# Patient Record
Sex: MALE | Race: WHITE | Employment: FULL TIME | URBAN - METROPOLITAN AREA
[De-identification: names, ages, dates, MRNs, and addresses within clinical notes are randomized per-mention and may not be internally consistent; named-entity substitution may affect disease eponyms.]

---

## 2019-08-16 ENCOUNTER — APPOINTMENT (OUTPATIENT)
Dept: GENERAL RADIOLOGY | Facility: CLINIC | Age: 34
End: 2019-08-16
Attending: EMERGENCY MEDICINE
Payer: COMMERCIAL

## 2019-08-16 ENCOUNTER — HOSPITAL ENCOUNTER (EMERGENCY)
Facility: CLINIC | Age: 34
Discharge: HOME | End: 2019-08-16
Attending: EMERGENCY MEDICINE | Admitting: EMERGENCY MEDICINE
Payer: COMMERCIAL

## 2019-08-16 VITALS
OXYGEN SATURATION: 98 % | TEMPERATURE: 97.7 F | DIASTOLIC BLOOD PRESSURE: 83 MMHG | SYSTOLIC BLOOD PRESSURE: 161 MMHG | RESPIRATION RATE: 18 BRPM | HEART RATE: 65 BPM

## 2019-08-16 DIAGNOSIS — R00.2 PALPITATIONS: ICD-10-CM

## 2019-08-16 DIAGNOSIS — E87.6 HYPOKALEMIA: ICD-10-CM

## 2019-08-16 LAB
ANION GAP SERPL CALCULATED.3IONS-SCNC: 8 MMOL/L (ref 3–14)
BASOPHILS # BLD AUTO: 0.1 10E9/L (ref 0–0.2)
BASOPHILS NFR BLD AUTO: 0.7 %
BUN SERPL-MCNC: 18 MG/DL (ref 7–30)
CALCIUM SERPL-MCNC: 9.1 MG/DL (ref 8.5–10.1)
CHLORIDE SERPL-SCNC: 107 MMOL/L (ref 94–109)
CO2 SERPL-SCNC: 26 MMOL/L (ref 20–32)
CREAT SERPL-MCNC: 1.13 MG/DL (ref 0.66–1.25)
DIFFERENTIAL METHOD BLD: NORMAL
EOSINOPHIL # BLD AUTO: 0.2 10E9/L (ref 0–0.7)
EOSINOPHIL NFR BLD AUTO: 2 %
ERYTHROCYTE [DISTWIDTH] IN BLOOD BY AUTOMATED COUNT: 12.5 % (ref 10–15)
GFR SERPL CREATININE-BSD FRML MDRD: 85 ML/MIN/{1.73_M2}
GLUCOSE SERPL-MCNC: 104 MG/DL (ref 70–99)
HCT VFR BLD AUTO: 43.6 % (ref 40–53)
HGB BLD-MCNC: 15.5 G/DL (ref 13.3–17.7)
IMM GRANULOCYTES # BLD: 0 10E9/L (ref 0–0.4)
IMM GRANULOCYTES NFR BLD: 0.3 %
LYMPHOCYTES # BLD AUTO: 1.6 10E9/L (ref 0.8–5.3)
LYMPHOCYTES NFR BLD AUTO: 20.6 %
MCH RBC QN AUTO: 31.1 PG (ref 26.5–33)
MCHC RBC AUTO-ENTMCNC: 35.6 G/DL (ref 31.5–36.5)
MCV RBC AUTO: 88 FL (ref 78–100)
MONOCYTES # BLD AUTO: 0.6 10E9/L (ref 0–1.3)
MONOCYTES NFR BLD AUTO: 8.1 %
NEUTROPHILS # BLD AUTO: 5.3 10E9/L (ref 1.6–8.3)
NEUTROPHILS NFR BLD AUTO: 68.3 %
NRBC # BLD AUTO: 0 10*3/UL
NRBC BLD AUTO-RTO: 0 /100
PLATELET # BLD AUTO: 250 10E9/L (ref 150–450)
POTASSIUM SERPL-SCNC: 3.2 MMOL/L (ref 3.4–5.3)
RBC # BLD AUTO: 4.98 10E12/L (ref 4.4–5.9)
SODIUM SERPL-SCNC: 141 MMOL/L (ref 133–144)
TROPONIN I SERPL-MCNC: <0.015 UG/L (ref 0–0.04)
TSH SERPL DL<=0.005 MIU/L-ACNC: 1.54 MU/L (ref 0.4–4)
WBC # BLD AUTO: 7.7 10E9/L (ref 4–11)

## 2019-08-16 PROCEDURE — 84443 ASSAY THYROID STIM HORMONE: CPT | Performed by: EMERGENCY MEDICINE

## 2019-08-16 PROCEDURE — 99285 EMERGENCY DEPT VISIT HI MDM: CPT

## 2019-08-16 PROCEDURE — 25000132 ZZH RX MED GY IP 250 OP 250 PS 637: Performed by: EMERGENCY MEDICINE

## 2019-08-16 PROCEDURE — 71046 X-RAY EXAM CHEST 2 VIEWS: CPT

## 2019-08-16 PROCEDURE — 85025 COMPLETE CBC W/AUTO DIFF WBC: CPT | Performed by: EMERGENCY MEDICINE

## 2019-08-16 PROCEDURE — 80048 BASIC METABOLIC PNL TOTAL CA: CPT | Performed by: EMERGENCY MEDICINE

## 2019-08-16 PROCEDURE — 84484 ASSAY OF TROPONIN QUANT: CPT | Performed by: EMERGENCY MEDICINE

## 2019-08-16 RX ORDER — POTASSIUM CHLORIDE 1500 MG/1
20 TABLET, EXTENDED RELEASE ORAL 2 TIMES DAILY
Qty: 14 TABLET | Refills: 0 | Status: SHIPPED | OUTPATIENT
Start: 2019-08-16 | End: 2019-08-23

## 2019-08-16 RX ORDER — POTASSIUM CHLORIDE 1.5 G/1.58G
40 POWDER, FOR SOLUTION ORAL ONCE
Status: COMPLETED | OUTPATIENT
Start: 2019-08-16 | End: 2019-08-16

## 2019-08-16 RX ADMIN — POTASSIUM CHLORIDE 40 MEQ: 1.5 POWDER, FOR SOLUTION ORAL at 18:44

## 2019-08-16 ASSESSMENT — ENCOUNTER SYMPTOMS
BLOOD IN STOOL: 1
FEVER: 0
DYSURIA: 0
FREQUENCY: 1
SHORTNESS OF BREATH: 0
CHEST TIGHTNESS: 1
NUMBNESS: 1
ABDOMINAL PAIN: 0
VOMITING: 0
PALPITATIONS: 1
COUGH: 0
RHINORRHEA: 0
NAUSEA: 0
DIAPHORESIS: 0

## 2019-08-16 NOTE — ED PROVIDER NOTES
"  History     Chief Complaint:  Chest Pain      HPI   Cong Coronel is a 34 year old male with a history of obesity, hypertension and anxiety who presents to the emergency department for evaluation of chest pain. The patient reports that for the last few months he's had intermittent episodes of \"thumping\" chest pain. He has been seen at [facility] several times with negative workups, echo below, and was told it is likely attributed to anxiety attacks. However, last night while watching TV, in addition to his typical chest pains, he had new jaw numbness, teeth tingling, warmth under his eyes, palpitations, and tingling, numbness, and chills in his fingertips and upper and lower extremities. He states he then did breathing exercises that mildly improved his symptoms and he was able to go to bed. Then today, he called his PCP and reported these new symptoms who recommended he come to the ED. Here, he reports of mild chest tightness. He denies the chest pain worsens with exertion. Additionally, he states that his last few bowel movements have been harder than usual and very painful, and he noticed blood on the toilet paper when he wiped. He's also had an increase in urinary frequency.  Patient denies nausea, vomiting, shortness of breath, diaphoresis, fevers, cough, runny nose, dysuria, abdominal pain, or regular caffeine use.    ECHOCARDIOGRAM.  Date: 12/13/2018 Start: 08:01 AM [facility]: [facility]  Technically difficult examination. Poor acoustic windows due to body  habitus .  Left ventricular ejection fraction is visually estimated at 55%.  No significant valvular abnormalities were identified.    Cardiac Risk Factors   Sex: Male   Tobacco: Negative   Hypertension: Positive  Diabetes: Negative  Hyperlipidemia: Negative  Family History: Negative    PE/DVT Risk Factors   Personal History: Negative  Recent Travel: Negative   Recent Surgery/Hospitalization: Negative  Tobacco: Negative  Family " History: Positive  Hormone Use: Negative   Cancer: Negative  Trauma: Negative      Allergies:  No Known Drug Allergies     Medications:    Oretic  Inderal  Zantac  Hydrochlorothiazide  Ativan     Past Medical History:    Obesity   Anxiety  Hypertension     Past Surgical History:    History reviewed. No pertinent past surgical history.    Family History:    Hypertension  DVTS?    Social History:  Occupation: Analyst   Tobacco Use: Never  Alcohol Use: No  PCP: Kingsley Pina   Marital Status:  Single      Review of Systems   Constitutional: Negative for diaphoresis and fever.   HENT: Negative for rhinorrhea.    Respiratory: Positive for chest tightness. Negative for cough and shortness of breath.    Cardiovascular: Positive for palpitations.   Gastrointestinal: Positive for blood in stool. Negative for abdominal pain, nausea and vomiting.   Genitourinary: Positive for frequency. Negative for dysuria.   Neurological: Positive for numbness.   All other systems reviewed and are negative.    Physical Exam     Patient Vitals for the past 24 hrs:   BP Temp Temp src Pulse Heart Rate Resp SpO2   08/16/19 1706 (!) 161/83 97.7  F (36.5  C) Oral 65 97 18 98 %     Physical Exam  Constitutional: Well developed, nontox appearance  Head: Atraumatic.   Mouth/Throat: Oropharynx is clear and moist.   Neck:  no stridor  Eyes: no scleral icterus  Cardiovascular: RRR, 2+ bilat radial pulses  Pulmonary/Chest: nml resp effort, Clear BS bilat  Abdominal: ND, +BS, soft, NT, no rebound or guarding   Ext: Warm, well perfused, no edema  Neurological: A&O, symmetric facies, moves ext x4  Skin: Skin is warm and dry.   Psychiatric: Behavior is normal. Thought content normal.   Nursing note and vitals reviewed.    Emergency Department Course   ECG:  @ 1706  Indication: Chest pain  Vent. Rate 63 bpm. CT interval 154 ms. QRS duration 102 ms. QT/QTc 408/417 ms. P-R-T axis 56 50 50.   Normal sinus rhythm. Normal ECG.    Read @ 1708 by   Robinson.    Imaging:  Chest X-Ray. 2 Views:   IMPRESSION: No acute cardiopulmonary disease.  Reading per radiology.     Radiographic findings were communicated with the patient who voiced understanding of the findings.    Laboratory:  CBC: WBC: 7.7, HGB: 15.5, PLT: 250  BMP: Glucose 104 (H), Potassium: 3.2 (L), o/w WNL (Creatinine: 1.13)    TSH with free T4 reflex: 1.54    1736 Troponin I: <0.015    Interventions:  1844 Potassium chloride packet 40 mEq PO    Emergency Department Course:  1715 Nursing notes and vitals reviewed. I performed an exam of the patient as documented above.     EKG was done, interpretation as above.    Medicine administered as documented above. Blood drawn. This was sent to the lab for further testing, results above.    The patient was sent for a chest XR while in the emergency department, findings above.     185 I rechecked the patient and discussed the results of his workup thus far.     Findings and plan explained to the Patient. Patient discharged home with instructions regarding supportive care, medications, and reasons to return. The importance of close follow-up was reviewed. The patient was prescribed potassium chloride.     I personally reviewed the laboratory results with the Patient and answered all related questions prior to discharge.     Impression & Plan    Medical Decision Makin year old male presenting w/ palpitations, chest discomfort     DDx includes palpitations, anxiety, electrolyte abnormality, thyroid dysfunction chest pain NOS, chest wall pain, GERD, pneumothorax, pneumonia.  ACS, PE, dissection seem unlikely given patient's risk factors, description of symptoms.  EKG interpretation as noted above sig for no acute ischemic findings or evidence of dysrhythmia.  Labs and imaging ordered as noted above.  Labs significant for mild hypokalemia otherwise unremarkable.  Imaging sig for no acute cardiopulmonary disease.  Potassium repleted as noted above.  Given  unremarkable work-up, I think the patient is safe for discharge.  Discussed various outpatient options for further evaluation including stress test and the patient elected to follow-up with his primary care doctor in 3 days for reevaluation.  I think this is unlikely to be ACS particularly given the patient reports exercising somewhat vigorously and remaining asymptomatic.  Possible anxiety component to symptoms as well.  At this time I feel the patient is safe for discharge.  Recommendations given regarding follow up with PCP and return to the emergency department as needed for new or worsening symptoms.  Patient counseled on all results, diagnosis and disposition.  They are understanding and agreeable to plan. Patient discharged in stable condition.      Diagnosis:    ICD-10-CM    1. Palpitations R00.2    2. Hypokalemia E87.6        Disposition:  Discharged to home    Discharge Medications:  New Prescriptions    POTASSIUM CHLORIDE ER (K-TAB) 20 MEQ CR TABLET    Take 1 tablet (20 mEq) by mouth 2 times daily for 7 days     Scribe Disclosure:  I, Bebeto Alarcon, am serving as a scribe on 8/16/2019 at 5:15 PM to personally document services performed by Anuj Varner MD based on my observations and the provider's statements to me.     Bebeto Alarcon  8/16/2019    EMERGENCY DEPARTMENT       Anuj Bowers MD  08/17/19 [id number]

## 2019-08-16 NOTE — ED AVS SNAPSHOT
Emergency Department  [address] [place] [address]  Phone:  [phone]  Fax:  [phone]                                    Cong Coronel   MRN: [id number]    Department:   Emergency Department   Date of Visit:  8/16/2019           After Visit Summary Signature Page    I have received my discharge instructions, and my questions have been answered. I have discussed any challenges I see with this plan with the nurse or doctor.    ..........................................................................................................................................  Patient/Patient Representative Signature      ..........................................................................................................................................  Patient Representative Print Name and Relationship to Patient    ..................................................               ................................................  Date                                   Time    ..........................................................................................................................................  Reviewed by Signature/Title    ...................................................              ..............................................  Date                                               Time          22EPIC Rev 08/18

## 2019-08-17 NOTE — DISCHARGE INSTRUCTIONS
Drink plenty of fluids.    Please return to the emergency department as needed for new or worsening symptoms including new or worsening chest pain, fainting, vomiting and unable to keep anything down, significant shortness of breath, any other concerning symptoms.      Discharge Instructions  Palpitations    Palpitations are an unusual awareness of your heartbeat. People often describe this as the heart skipping, fluttering, racing, irregular, or pounding. At this time, your provider has found no signs that your palpitations are due to a serious or life-threatening condition. However, sometimes there is a serious problem that does not show up right away.    Palpitations can be caused by caffeine, cigarettes, diet pills, energy drinks or supplements, other stimulants, and medications and street drugs. They can also be caused by anxiety, hormone conditions such as high thyroid, and other medical conditions. Sometimes they are a sign of abnormal rhythm in the heart. At this time, your provider did not find any dangerous cause of your symptoms.    Generally, every Emergency Department visit should have a follow-up clinic visit with either a primary or a specialty clinic/provider. Please follow-up as instructed by your emergency provider today.    Return to the Emergency Department if:  You get chest pain or tightness.  You are short of breath.  You get very weak or tired.  You pass out or faint.  Your heart rate is over 120 beats per minute for more than 10 minutes while you are resting.   You have anything else that worries you.    What can I do to help myself?  Fill any prescriptions the provider gave you and take them right away.   Follow your provider s instructions about the prescription medicines you are on. Sometimes the provider may tell you to stop taking a medicine or change the dose.  If you smoke, this may be a good time to quit! The less you can smoke, the better.  Do not use energy drinks, diet pills, or  stimulants. Limit your use of caffeine.  If you were given a prescription for medicine here today, be sure to read all of the information (including the package insert) that comes with your prescription.  This will include important information about the medicine, its side effects, and any warnings that you need to know about.  The pharmacist who fills the prescription can provide more information and answer questions you may have about the medicine.  If you have questions or concerns that the pharmacist cannot address, please call or return to the Emergency Department.     Remember that you can always come back to the Emergency Department if you are not able to see your regular provider in the amount of time listed above, if you get any new symptoms, or if there is anything that worries you.

## 2019-09-25 ENCOUNTER — HOSPITAL ENCOUNTER (EMERGENCY)
Facility: CLINIC | Age: 34
Discharge: HOME | End: 2019-09-25
Attending: EMERGENCY MEDICINE | Admitting: EMERGENCY MEDICINE
Payer: COMMERCIAL

## 2019-09-25 VITALS
HEIGHT: 77 IN | SYSTOLIC BLOOD PRESSURE: 139 MMHG | DIASTOLIC BLOOD PRESSURE: 80 MMHG | OXYGEN SATURATION: 97 % | HEART RATE: 63 BPM | BODY MASS INDEX: 35.42 KG/M2 | TEMPERATURE: 98.4 F | RESPIRATION RATE: 18 BRPM | WEIGHT: 300 LBS

## 2019-09-25 DIAGNOSIS — R22.0 TONGUE SWELLING: ICD-10-CM

## 2019-09-25 PROCEDURE — 99282 EMERGENCY DEPT VISIT SF MDM: CPT

## 2019-09-25 RX ORDER — BUSPIRONE HYDROCHLORIDE 15 MG/1
15 TABLET ORAL 2 TIMES DAILY
COMMUNITY

## 2019-09-25 RX ORDER — HYDROCHLOROTHIAZIDE 25 MG/1
25 TABLET ORAL DAILY
COMMUNITY

## 2019-09-25 SDOH — HEALTH STABILITY: MENTAL HEALTH: HOW OFTEN DO YOU HAVE A DRINK CONTAINING ALCOHOL?: NEVER

## 2019-09-25 ASSESSMENT — ENCOUNTER SYMPTOMS
VOMITING: 0
NAUSEA: 0
SHORTNESS OF BREATH: 0
TROUBLE SWALLOWING: 0

## 2019-09-25 ASSESSMENT — MIFFLIN-ST. JEOR: SCORE: 2423.17

## 2019-09-25 NOTE — ED PROVIDER NOTES
"  History     Chief Complaint:  Allergic reaction     HPI   Cong Coronel is a 34 year old male who presents with concern for an allergic reaction. The patient noticed a swollen tongue feeling this morning around 1000. This persisted and thus he called a nursing line this evening and was recommended he visit the ED. The patient has driven himself to the ED. Here, he denies any shortness of breath, troubles swallowing, rash, nausea, or vomiting.     Allergies:  The patient has no known drug allergies.    Medications:    busPIRone (BUSPAR) 15 MG tablet  hydrochlorothiazide (HYDRODIURIL) 25 MG tablet      Past Medical History:    Hypertension     Past Surgical History:    The patient does not have any pertinent past surgical history.    Family History:    No past pertinent family history.    Social History:  Marital Status:  Single   Smoker:   Never   Smokeless:   Never   Alcohol:   Never   Drugs:   Never    Review of Systems   HENT: Negative for trouble swallowing.    Respiratory: Negative for shortness of breath.    Gastrointestinal: Negative for nausea and vomiting.   All other systems reviewed and are negative.    Physical Exam     Patient Vitals for the past 24 hrs:   BP Temp Temp src Pulse Resp SpO2 Height Weight   09/25/19 1841 139/80 98.4  F (36.9  C) Oral 63 18 97 % 1.956 m (6' 5\") 136.1 kg (300 lb)     Physical Exam  Nursing note and vitals reviewed.   Constitutional:  Oriented to person, place, and time. Cooperative.   HENT:   Nose:    Nose normal.   Mouth/Throat:   Mucous membranes are normal. No swelling to the tongue appreciated.   Eyes:    Conjunctivae normal and EOM are normal.      Pupils are equal, round, and reactive to light.   Neck:    Trachea normal.   Cardiovascular:  Normal rate, regular rhythm, normal heart sounds and normal pulses. No murmur heard.  Pulmonary/Chest:  Effort normal and breath sounds normal.   Abdominal:   Soft. Normal appearance and bowel sounds are normal.      There is no " tenderness.      There is no rebound and no CVA tenderness.   Musculoskeletal:  Extremities atraumatic x 4.   Lymphadenopathy:  No cervical adenopathy.   Neurological:   Alert and oriented to person, place, and time. Normal strength.      No cranial nerve deficit or sensory deficit. GCS eye subscore is 4. GCS verbal subscore is 5. GCS motor subscore is 6.   Skin:    Skin is intact. No rash noted.   Psychiatric:   Normal mood and affect.    Emergency Department Course   Emergency Department Course:  1905 I performed an exam of the patient as documented above.     Findings and plan explained. Patient discharged home with instructions regarding supportive care and reasons to return. The importance of close follow-up was reviewed. I personally answered all related questions prior to discharge.    Impression & Plan    Medical Decision Making:  This is a 34-year-old male who came in concerned about tongue swelling.  He has absolutely no other symptoms to suggest an allergic reaction.  He also does not have any appreciable swelling to his tongue on exam.  Therefore I suspect that there is an anxiety component rather than an actual allergic reaction.  I reassured him that he will likely be fine.  He is to follow-up with his physician as needed and return though with any concerns or worsening symptoms.    Diagnosis:    ICD-10-CM    1. Sensation of tongue swelling R22.0      Disposition:  discharged to home  Scribe Disclosure:  I, Kip Knight, am serving as a scribe on 9/25/2019 at 7:05 PM to personally document services performed by Frank Rg MD based on my observations and the provider's statements to me.     Kip Knight  9/25/2019    EMERGENCY DEPARTMENT       Frank Rg MD  09/25/19 1276

## 2019-09-25 NOTE — ED AVS SNAPSHOT
Emergency Department  [address] [place] [address]  Phone:  [phone]  Fax:  [phone]                                    Cong Coronel   MRN: [id number]    Department:   Emergency Department   Date of Visit:  9/25/2019           After Visit Summary Signature Page    I have received my discharge instructions, and my questions have been answered. I have discussed any challenges I see with this plan with the nurse or doctor.    ..........................................................................................................................................  Patient/Patient Representative Signature      ..........................................................................................................................................  Patient Representative Print Name and Relationship to Patient    ..................................................               ................................................  Date                                   Time    ..........................................................................................................................................  Reviewed by Signature/Title    ...................................................              ..............................................  Date                                               Time          22EPIC Rev 08/18

## 2020-03-22 ENCOUNTER — HOSPITAL ENCOUNTER (EMERGENCY)
Facility: CLINIC | Age: 35
Discharge: HOME | End: 2020-03-22
Attending: EMERGENCY MEDICINE | Admitting: EMERGENCY MEDICINE
Payer: COMMERCIAL

## 2020-03-22 VITALS
HEART RATE: 82 BPM | WEIGHT: 300 LBS | DIASTOLIC BLOOD PRESSURE: 83 MMHG | SYSTOLIC BLOOD PRESSURE: 131 MMHG | OXYGEN SATURATION: 98 % | RESPIRATION RATE: 16 BRPM | TEMPERATURE: 97.9 F | BODY MASS INDEX: 35.57 KG/M2

## 2020-03-22 DIAGNOSIS — R55 VASOVAGAL SYNCOPE: ICD-10-CM

## 2020-03-22 DIAGNOSIS — J02.9 SORE THROAT: ICD-10-CM

## 2020-03-22 LAB
ALBUMIN SERPL-MCNC: 3.7 G/DL (ref 3.4–5)
ALP SERPL-CCNC: 74 U/L (ref 40–150)
ALT SERPL W P-5'-P-CCNC: 36 U/L (ref 0–70)
ANION GAP SERPL CALCULATED.3IONS-SCNC: 5 MMOL/L (ref 3–14)
AST SERPL W P-5'-P-CCNC: 18 U/L (ref 0–45)
BASOPHILS # BLD AUTO: 0 10E9/L (ref 0–0.2)
BASOPHILS NFR BLD AUTO: 0.2 %
BILIRUB SERPL-MCNC: 0.4 MG/DL (ref 0.2–1.3)
BUN SERPL-MCNC: 17 MG/DL (ref 7–30)
CALCIUM SERPL-MCNC: 8.7 MG/DL (ref 8.5–10.1)
CHLORIDE SERPL-SCNC: 105 MMOL/L (ref 94–109)
CO2 SERPL-SCNC: 28 MMOL/L (ref 20–32)
CREAT SERPL-MCNC: 1.29 MG/DL (ref 0.66–1.25)
DEPRECATED S PYO AG THROAT QL EIA: NEGATIVE
DIFFERENTIAL METHOD BLD: ABNORMAL
EOSINOPHIL # BLD AUTO: 0.1 10E9/L (ref 0–0.7)
EOSINOPHIL NFR BLD AUTO: 0.6 %
ERYTHROCYTE [DISTWIDTH] IN BLOOD BY AUTOMATED COUNT: 12.3 % (ref 10–15)
GFR SERPL CREATININE-BSD FRML MDRD: 72 ML/MIN/{1.73_M2}
GLUCOSE SERPL-MCNC: 123 MG/DL (ref 70–99)
HCT VFR BLD AUTO: 45.4 % (ref 40–53)
HGB BLD-MCNC: 15.5 G/DL (ref 13.3–17.7)
IMM GRANULOCYTES # BLD: 0 10E9/L (ref 0–0.4)
IMM GRANULOCYTES NFR BLD: 0.2 %
INTERPRETATION ECG - MUSE: NORMAL
LYMPHOCYTES # BLD AUTO: 0.7 10E9/L (ref 0.8–5.3)
LYMPHOCYTES NFR BLD AUTO: 5.4 %
MCH RBC QN AUTO: 29.8 PG (ref 26.5–33)
MCHC RBC AUTO-ENTMCNC: 34.1 G/DL (ref 31.5–36.5)
MCV RBC AUTO: 87 FL (ref 78–100)
MONOCYTES # BLD AUTO: 1 10E9/L (ref 0–1.3)
MONOCYTES NFR BLD AUTO: 7.5 %
NEUTROPHILS # BLD AUTO: 11.3 10E9/L (ref 1.6–8.3)
NEUTROPHILS NFR BLD AUTO: 86.1 %
PLATELET # BLD AUTO: 233 10E9/L (ref 150–450)
POTASSIUM SERPL-SCNC: 3.5 MMOL/L (ref 3.4–5.3)
PROT SERPL-MCNC: 7.2 G/DL (ref 6.8–8.8)
RBC # BLD AUTO: 5.21 10E12/L (ref 4.4–5.9)
SODIUM SERPL-SCNC: 138 MMOL/L (ref 133–144)
SPECIMEN SOURCE: NORMAL
SPECIMEN SOURCE: NORMAL
STREP GROUP A PCR: NOT DETECTED
WBC # BLD AUTO: 13.1 10E9/L (ref 4–11)

## 2020-03-22 PROCEDURE — 93005 ELECTROCARDIOGRAM TRACING: CPT

## 2020-03-22 PROCEDURE — 99284 EMERGENCY DEPT VISIT MOD MDM: CPT | Mod: 25

## 2020-03-22 PROCEDURE — 96360 HYDRATION IV INFUSION INIT: CPT

## 2020-03-22 PROCEDURE — 40001204 ZZHCL STATISTIC STREP A RAPID: Performed by: EMERGENCY MEDICINE

## 2020-03-22 PROCEDURE — 85025 COMPLETE CBC W/AUTO DIFF WBC: CPT | Performed by: EMERGENCY MEDICINE

## 2020-03-22 PROCEDURE — 87651 STREP A DNA AMP PROBE: CPT | Performed by: EMERGENCY MEDICINE

## 2020-03-22 PROCEDURE — 80053 COMPREHEN METABOLIC PANEL: CPT | Performed by: EMERGENCY MEDICINE

## 2020-03-22 PROCEDURE — 25800030 ZZH RX IP 258 OP 636: Performed by: EMERGENCY MEDICINE

## 2020-03-22 RX ADMIN — SODIUM CHLORIDE 1000 ML: 9 INJECTION, SOLUTION INTRAVENOUS at 01:04

## 2020-03-22 ASSESSMENT — ENCOUNTER SYMPTOMS
SHORTNESS OF BREATH: 0
SORE THROAT: 1
COUGH: 0
FEVER: 0

## 2020-03-22 NOTE — ED NOTES
Bed: ED10  Expected date:   Expected time:   Means of arrival:   Comments:  533 35M Syncope, sore throat.  No fever.  0025

## 2020-03-22 NOTE — ED AVS SNAPSHOT
Emergency Department  [address] [place] [address]  Phone:  [phone]  Fax:  [phone]                                    Cong Coronel   MRN: [id number]    Department:   Emergency Department   Date of Visit:  3/22/2020           After Visit Summary Signature Page    I have received my discharge instructions, and my questions have been answered. I have discussed any challenges I see with this plan with the nurse or doctor.    ..........................................................................................................................................  Patient/Patient Representative Signature      ..........................................................................................................................................  Patient Representative Print Name and Relationship to Patient    ..................................................               ................................................  Date                                   Time    ..........................................................................................................................................  Reviewed by Signature/Title    ...................................................              ..............................................  Date                                               Time          22EPIC Rev 08/18

## 2020-03-22 NOTE — ED TRIAGE NOTES
pt woke up with sore throat at 0300. went to bathroom tonight, was sitting on toilet and felt nauseous. pt woke up on floor with bloody nose.

## 2020-03-22 NOTE — DISCHARGE INSTRUCTIONS
Regardless of if you have been tested or not:  Patient who have symptoms (cough, fever, or shortness of breath), need to isolate for 7 days from when symptoms started OR 72 hours after fever resolves (without fever reducing medications) AND improvement of respiratory symptoms (whichever is longer).    Isolate yourself at home (in own room/own bathroom if possible)  Do Not allow any visitors  Do Not go to work or school  Do Not go to Jew,  centers, shopping, or other public places.  Do Not shake hands.  Avoid close and intimate contact with others (hugging, kissing).  Follow CDC recommendations for household cleaning of frequently touched services.     After the initial 7 days, continue to isolate yourself from household members as much as possible. To continue decrease the risk of community spread and exposure, you and any members of your household should limit activities in public for 14 days after starting home isolation.     You can reference the following CDC link for helpful home isolation/care tips:  https://www.cdc.gov/coronavirus/2019-ncov/downloads/10Things.pdf    Protect Others:  Cover Your Mouth and Nose with a mask, disposable tissue or wash cloth to avoid spreading germs to others.  Wash your hands and face frequently with soap and water    Call Back If: Breathing difficulty develops or you become worse.    For more information about COVID19 and options for caring for yourself at home, please visit the CDC website at https://www.cdc.gov/coronavirus/2019-ncov/about/steps-when-sick.html  For more options for care at [facility], please visit our website at [email or web address]

## 2020-03-22 NOTE — ED PROVIDER NOTES
History     Chief Complaint:  Loss of Consciousness    HPI   Cong Coronel is a 35 year old male with a history of HTN who presents to the emergency department via EMS for evaluation of a syncopal episode. Patient reports waking up today with a sore throat and congestion and took Dayquil and Nyquil. Patient woke up to go have a bowel movement and while sitting on the toilet he passed out and woke up on the ground with a bloody nose. He denies fever, cough, shortness of breath.    Allergies:  No known drug allergies    Medications:    Buspirone  Hydrochlorothiazide  Omeprazole    Past Medical History:    HTN  Obesity    Past Surgical History:    History reviewed. No pertinent surgical history.    Family History:    History reviewed. No pertinent family history.     Social History:  Smoking status: never smoker  Alcohol use: no  Drug use: no  The patient presents to the emergency department via EMS.  PCP: Kingsley Pina  Marital Status:  Single     Review of Systems   Constitutional: Negative for fever.   HENT: Positive for congestion, nosebleeds and sore throat.    Respiratory: Negative for cough and shortness of breath.    Neurological: Positive for syncope.   All other systems reviewed and are negative.      Physical Exam     Patient Vitals for the past 24 hrs:   BP Temp Temp src Pulse Resp SpO2 Weight   03/22/20 0240 131/83 -- -- 82 16 98 % --   03/22/20 0033 (!) 146/81 97.9  F (36.6  C) Oral 76 16 95 % 136.1 kg (300 lb)       Physical Exam  General: Appears well-developed and well-nourished.   Head: Dried blood in nares.  No nasal deformity  Mouth/Throat: Oropharynx is clear and moist.   Eyes: Conjunctivae are normal. Pupils are equal, round, and reactive to light.   Neck: Normal range of motion.   CV: Normal rate and regular rhythm.    Resp: Effort normal and breath sounds normal. No respiratory distress.   GI: Soft. There is no tenderness.  No rebound or guarding.  Normal bowel sounds.    MSK: Normal  range of motion. no edema. No Calf tenderness.  Neuro: The patient is alert and oriented to person, place, and time.  PERRLA, EOMI, strength in upper/lower extremities normal and symmetrical. Sensation normal. Speech normal.  GCS 15  Skin: Skin is warm and dry. No rash noted.   Psych: normal mood and affect. behavior is normal.       Emergency Department Course   ECG (1:08:53):  Rate 75 bpm. OH interval 164. QRS duration 92. QT/QTc 398/444. P-R-T axes 52 30 33. Normal sinus rhythm. Normal ECG. Interpreted at 208. by Ralph Mccarty MD.    Laboratory:  CBC: WBC: 13.1 (H), HGB: 15.5, PLT: 233  CMP: Glucose 123 (H), o/w WNL (Creatinine: 1.29 (H))  Streptococcus A rapid screen with reflx to PCR: negative  Group A streptococcus PCR throat swab: pending    Interventions:  104 NS 1000 mL IV    Emergency Department Course:  Nursing notes and vitals reviewed. (0039) I performed an exam of the patient as documented above.     IV inserted. Medicine administered as documented above. Blood drawn. Strep swab obtained. This was sent to the lab for further testing, results above.     An EKG was recorded. Results as noted above.     213 I rechecked the patient and discussed the results of his workup thus far.     Findings and plan explained to the Patient. Patient discharged home with instructions regarding supportive care, medications, and reasons to return. The importance of close follow-up was reviewed.     I personally reviewed the laboratory results with the Patient and answered all related questions prior to discharge.      Impression & Plan    Medical Decision Making:  Cong Coronel is a 35-year-old gentleman who presents after an apparent syncopal episode.  He had been ill with a sore throat through the day along with some congestion.  He woke up to have a bowel movement and next thing he knew he was on the ground. He sounds to have had a vasovagal episode likely exacerbated by the fact that he is somewhat dehydrated  from not drinking much secondary to sore throat.  He had no neurologic deficits and his exam was overall benign.  He had had some blood from his nose after the fall, but this is stopped.  There is no clear deformity of the nose.  Blood was obtained that was non-concerning and patient was given IV fluids.  Felt patient was appropriate for discharge home.  I recommended he continue to push plenty fluids along with Tylenol and iburofen for sore throat.  I did discuss the possibility of coronavirus, but unfortunately testing is not available.  He was recommended to self quarantine.  I did discuss follow up with ENT regarding his nose.    Diagnosis:    ICD-10-CM    1. Sore throat  J02.9 Streptococcus A Rapid Scr w Reflx to PCR     Group A Streptococcus PCR Throat Swab     Group A Streptococcus PCR Throat Swab   2. Vasovagal syncope  R55        Disposition:  discharged to home  Lloyd Vale  3/22/2020    EMERGENCY DEPARTMENT  Scribe Disclosure:  Lloyd PATEL, am serving as a scribe at 12:39 AM on 3/22/2020 to document services personally performed by Ralph Mccarty MD based on my observations and the provider's statements to me.        Ralph Mccarty MD  03/22/20 0509     11-May-2018 00:01

## 2020-03-25 ENCOUNTER — HOSPITAL ENCOUNTER (EMERGENCY)
Facility: CLINIC | Age: 35
Discharge: HOME | End: 2020-03-25
Attending: EMERGENCY MEDICINE | Admitting: EMERGENCY MEDICINE
Payer: COMMERCIAL

## 2020-03-25 VITALS
HEART RATE: 100 BPM | SYSTOLIC BLOOD PRESSURE: 115 MMHG | DIASTOLIC BLOOD PRESSURE: 83 MMHG | RESPIRATION RATE: 16 BRPM | OXYGEN SATURATION: 93 % | TEMPERATURE: 100.1 F

## 2020-03-25 DIAGNOSIS — J02.9 PHARYNGITIS, UNSPECIFIED ETIOLOGY: ICD-10-CM

## 2020-03-25 DIAGNOSIS — E87.6 HYPOKALEMIA: ICD-10-CM

## 2020-03-25 DIAGNOSIS — R74.8 ELEVATED LIVER ENZYMES: ICD-10-CM

## 2020-03-25 DIAGNOSIS — B34.9 VIRAL SYNDROME: ICD-10-CM

## 2020-03-25 LAB
ALBUMIN SERPL-MCNC: 3.1 G/DL (ref 3.4–5)
ALP SERPL-CCNC: 176 U/L (ref 40–150)
ALT SERPL W P-5'-P-CCNC: 171 U/L (ref 0–70)
ANION GAP SERPL CALCULATED.3IONS-SCNC: 7 MMOL/L (ref 3–14)
AST SERPL W P-5'-P-CCNC: 60 U/L (ref 0–45)
BILIRUB SERPL-MCNC: 1.2 MG/DL (ref 0.2–1.3)
BUN SERPL-MCNC: 12 MG/DL (ref 7–30)
CALCIUM SERPL-MCNC: 8.9 MG/DL (ref 8.5–10.1)
CHLORIDE SERPL-SCNC: 98 MMOL/L (ref 94–109)
CO2 SERPL-SCNC: 30 MMOL/L (ref 20–32)
CREAT SERPL-MCNC: 1.24 MG/DL (ref 0.66–1.25)
GFR SERPL CREATININE-BSD FRML MDRD: 75 ML/MIN/{1.73_M2}
GLUCOSE SERPL-MCNC: 114 MG/DL (ref 70–99)
POTASSIUM SERPL-SCNC: 3.2 MMOL/L (ref 3.4–5.3)
PROT SERPL-MCNC: 7.4 G/DL (ref 6.8–8.8)
SODIUM SERPL-SCNC: 135 MMOL/L (ref 133–144)

## 2020-03-25 PROCEDURE — 96360 HYDRATION IV INFUSION INIT: CPT

## 2020-03-25 PROCEDURE — 25000132 ZZH RX MED GY IP 250 OP 250 PS 637: Performed by: EMERGENCY MEDICINE

## 2020-03-25 PROCEDURE — 25800030 ZZH RX IP 258 OP 636: Performed by: EMERGENCY MEDICINE

## 2020-03-25 PROCEDURE — 99283 EMERGENCY DEPT VISIT LOW MDM: CPT | Mod: 25

## 2020-03-25 PROCEDURE — 80053 COMPREHEN METABOLIC PANEL: CPT | Performed by: EMERGENCY MEDICINE

## 2020-03-25 RX ORDER — SODIUM CHLORIDE 9 MG/ML
1000 INJECTION, SOLUTION INTRAVENOUS CONTINUOUS
Status: DISCONTINUED | OUTPATIENT
Start: 2020-03-25 | End: 2020-03-25 | Stop reason: HOSPADM

## 2020-03-25 RX ORDER — IBUPROFEN 600 MG/1
600 TABLET, FILM COATED ORAL ONCE
Status: COMPLETED | OUTPATIENT
Start: 2020-03-25 | End: 2020-03-25

## 2020-03-25 RX ORDER — POTASSIUM CHLORIDE 1500 MG/1
20 TABLET, EXTENDED RELEASE ORAL 2 TIMES DAILY
Qty: 6 TABLET | Refills: 0 | Status: SHIPPED | OUTPATIENT
Start: 2020-03-25 | End: 2020-03-28

## 2020-03-25 RX ADMIN — SODIUM CHLORIDE 1000 ML: 9 INJECTION, SOLUTION INTRAVENOUS at 12:39

## 2020-03-25 RX ADMIN — IBUPROFEN 600 MG: 600 TABLET ORAL at 12:38

## 2020-03-25 NOTE — ED NOTES
Bed: ED02  Expected date:   Expected time:   Means of arrival:   Comments:  [facility] - 728 - 35 M positive covid symptoms coming from clinic eta 1140

## 2020-03-25 NOTE — DISCHARGE INSTRUCTIONS
Discharge Instructions  COVID-19    It is considered unlikely, though possible, that your symptoms are from COVID-19.  Please follow these recommendations to minimize risk to you and others.    Your Provider has determined that you should practice self-isolation and self-monitoring in order to protect yourself and your community from COVID-19, which is the disease caused by a new coronavirus. The virus spreads from person to person primarily by droplets when an infected person coughs or sneezes and the droplet either lands on another person or that other person touches a surface with the droplet on it. Diagnosis of COVID-19 can be made with a test but many times the test is unavailable or not necessary. There is no specific treatment or medicine for the disease.    Symptoms of COVID-19  Many people have no symptoms or mild symptoms.  Symptoms may usually appear 4 to 5 days (up to 14 days) after contact with another ill person. Some people will get severe symptoms and pneumonia. Usual symptoms are:     Fever    Cough    Trouble breathing    Less common symptoms are: Headache, body aches, sore throat, sneezing,               diarrhea.    How to Care for Yourself    Stay home  Most people will recover from illness with mild symptoms.  Isolation by staying home is the best method to prevent the spread of the illness. Do not go to work or school. Have a friend or relative do your shopping. Do not use public transportation (bus, train) or ridesharing (Lyft, Uber).    How long should I stay home?    If you have symptoms of a respiratory disease (fever, cough), you should stay home for at least 7 days, and for 3 days with no fever and improvement of respiratory symptoms--whichever is longer. (Your fever should be gone for 3 days without using fever-reducing medicine.)  For example, if you have a fever and coughing for 4 days, you need to stay home 3 more days with no fever for a total of 7 days. Or, if you have a fever and  coughing for 5 days, you need to stay home 3 more days with no fever for a total of 8 days.    Separate yourself from other people in your home.? As much as possible, you should stay in one room and away from other people in your home. Also, use a separate bathroom, if possible. Avoid handling pets or other animals while sick.     Wear a facemask: if you need to be around other people and cover your mouth and nose with a tissue when you cough or sneeze.     Avoid sharing personal household items. You should not share dishes, drinking glasses, forks/knives/spoons, towels, or bedding with other people in your home. After using these items, they should be washed with soap and water. Clean parts of your home that are touched often (doorknobs, faucets, countertops, etc.) daily.     Wash your hands often with soap and water for at least 20 seconds or use an alcohol-based hand  containing at least 60% alcohol.     Avoid touching your face    Treat your symptoms: Take over the counter medications like ibuprofen (Advil or Motrin) or Acetaminophen (Tylenol). Drink fluids. Rest.    Watch for worsening symptoms: shortness of breath, or difficulty breathing.    Return to the Emergency Department if:    If you are developing worsening breathing, shortness of breath, or feel worse you should seek medical attention.  If you are uncertain, contact your health care provider/clinic. If you need emergency medical attention, call 911 and tell them you have been ill.

## 2020-03-25 NOTE — ED PROVIDER NOTES
History     Chief Complaint:  Sore throat    HPI   Cong Coronel is a 35 year old male with a history of HTN and obesity who presents to the emergency department by EMS for evaluation of a sore throat that has been present for multiple days.  He was seen here a few days ago in this emergency department which time he had a negative strep swab, and work-up for syncope at that time was negative.  He went to clinic to get further evaluated for this today, and was referred here based on the infectious nature of his symptoms in light of the current COVID-19 situation.  Despite nursing notes suggesting otherwise, the patient states that he is not short of breath.  He denies a cough.  He remains able to eat and drink.  He has been taking some Advil with most recent dose last night.  He does not smoke anything and does not have a history of lung problems.  No chest pain.    Allergies:  NKDA     Medications:    Buspar  Prilosec  Hydrochlorothiazide     Past Medical History:    HTN  Obesity    Past Surgical History:    The patient does not have any pertinent past surgical history.     Family History:    No past pertinent family history.     Social History:  Presents alone.  Never smoker.  Negative for alcohol use.   Negative for drug use.     Review of Systems   All other systems reviewed and are negative.    Physical Exam     Patient Vitals for the past 24 hrs:   BP Temp Temp src Pulse Heart Rate Resp SpO2   03/25/20 1355 -- -- -- -- -- -- 93 %   03/25/20 1354 115/83 -- -- 100 -- -- 93 %   03/25/20 1347 -- -- -- -- -- -- 93 %   03/25/20 1314 -- -- -- -- -- -- 93 %   03/25/20 1300 -- -- -- -- -- -- 95 %   03/25/20 1156 134/84 100.1  F (37.8  C) Oral -- 118 16 93 %      Physical Exam  Gen: Nontoxic-appearing male sitting upright in room 2  HENT: mucous membranes moist, L TM wnl, R TM wnl, mastoids nontender, OP clear without swelling or exudate though with mild erythema symmetrically, no evidence of abscess, tongue  normal  Eyes: pupils normal, no scleral injection  CV: regular rhythm, cap refill normal  Resp: unlabored, speaks in full phrases, no cough observed  GI: abdomen soft and nontender, no guarding  MSK: no bony tenderness  Skin: appropriately warm and dry, no ecchymosis, no petechiae  Neuro: awake, alert, normal tone in extremities, no meningismus  Psych: calm, cooperative      Emergency Department Course     Laboratory:  CMP: Glucose 114 (H), Potassium 3.2 (L), Albumin 3.1 (L), Alkaline Phosphatase 176 (H),  (H), AST 60 (H), o/w WNL (Creatinine: 1.24)     Interventions:  1238 Ibuprofen 600 mg PO  1239 NS 1L IV BOLUS    Emergency Department Course:  Past medical records, nursing notes, and vitals reviewed.    1227 I obtained a history from the patient over the phone.     1229 I performed an exam of the patient as documented above.     IV was inserted and blood was drawn for laboratory testing, results above. Medication administered as noted above.     1340 I updated the patient via telephone and discussed the results of his workup thus far.     Findings and plan explained to the Patient. Patient discharged home with instructions regarding supportive care, medications, and reasons to return. The importance of close follow-up was reviewed. The patient was prescribed potassium chloride.     I personally reviewed the laboratory results with the Patient and answered all related questions prior to discharge.      Impression & Plan      Medical Decision Making:  His primary symptom is a sore throat, and with an elevated temperature and heart rate I think this is likely infectious in etiology.  Some corresponding ear discomfort and body aches, this is probably a viral syndrome.  I explained to him the unfortunate unavailability of Kindra testing.  I do not think that repeating the strep test is indicated.  He declined my offer to perform chest x-ray.  He was treated symptomatically and felt some improvement.  Modest  laboratory abnormalities specifically reviewed with the patient along with corresponding recommendation for outpatient follow-up to have them rechecked in the next few weeks.  He is comfortable to plan for discharge home and continued self quarantine.  Return here for sudden worsening at any hour.    Diagnosis:    ICD-10-CM   1. Pharyngitis, unspecified etiology  J02.9   2. Viral syndrome  B34.9      3. Hypokalemia  E87.6   4. Elevated liver enzymes  R74.8     Disposition:  discharged to home    Discharge Medications:  New Prescriptions    POTASSIUM CHLORIDE ER (K-TAB) 20 MEQ CR TABLET    Take 1 tablet (20 mEq) by mouth 2 times daily for 3 days     This record was created at least in part using electronic voice recognition software, so please excuse any typographical errors.     Scribe Disclosure:  I, Ave Rubio, am serving as a scribe on 3/25/2020 at 1:15 PM to personally document services performed by Zaki Blank, * based on my observations and the provider's statements to me.      Ave Rubio  3/25/2020    EMERGENCY DEPARTMENT       Zaki Blank MD  03/25/20 1751

## 2020-03-25 NOTE — ED AVS SNAPSHOT
Emergency Department  [address] [place] [address]  Phone:  [phone]  Fax:  [phone]                                    Cong Coronel   MRN: [id number]    Department:   Emergency Department   Date of Visit:  3/25/2020           After Visit Summary Signature Page    I have received my discharge instructions, and my questions have been answered. I have discussed any challenges I see with this plan with the nurse or doctor.    ..........................................................................................................................................  Patient/Patient Representative Signature      ..........................................................................................................................................  Patient Representative Print Name and Relationship to Patient    ..................................................               ................................................  Date                                   Time    ..........................................................................................................................................  Reviewed by Signature/Title    ...................................................              ..............................................  Date                                               Time          22EPIC Rev 08/18

## 2020-11-04 ENCOUNTER — HOSPITAL ENCOUNTER (EMERGENCY)
Facility: CLINIC | Age: 35
Discharge: HOME | End: 2020-11-04
Attending: EMERGENCY MEDICINE | Admitting: EMERGENCY MEDICINE
Payer: COMMERCIAL

## 2020-11-04 VITALS
OXYGEN SATURATION: 97 % | SYSTOLIC BLOOD PRESSURE: 146 MMHG | TEMPERATURE: 97 F | RESPIRATION RATE: 1 BRPM | HEART RATE: 89 BPM | DIASTOLIC BLOOD PRESSURE: 95 MMHG

## 2020-11-04 DIAGNOSIS — R22.0 MILD TONGUE SWELLING: ICD-10-CM

## 2020-11-04 PROCEDURE — 99283 EMERGENCY DEPT VISIT LOW MDM: CPT

## 2020-11-04 PROCEDURE — 250N000012 HC RX MED GY IP 250 OP 636 PS 637: Performed by: EMERGENCY MEDICINE

## 2020-11-04 PROCEDURE — 250N000013 HC RX MED GY IP 250 OP 250 PS 637: Performed by: EMERGENCY MEDICINE

## 2020-11-04 RX ORDER — PREDNISONE 20 MG/1
40 TABLET ORAL ONCE
Status: COMPLETED | OUTPATIENT
Start: 2020-11-04 | End: 2020-11-04

## 2020-11-04 RX ORDER — FAMOTIDINE 40 MG/1
40 TABLET, FILM COATED ORAL DAILY
Qty: 14 TABLET | Refills: 0 | Status: SHIPPED | OUTPATIENT
Start: 2020-11-04 | End: 2020-11-18

## 2020-11-04 RX ORDER — FAMOTIDINE 20 MG/1
40 TABLET, FILM COATED ORAL ONCE
Status: DISCONTINUED | OUTPATIENT
Start: 2020-11-04 | End: 2020-11-04 | Stop reason: CLARIF

## 2020-11-04 RX ORDER — FAMOTIDINE 10 MG
10 TABLET ORAL 2 TIMES DAILY
Status: DISCONTINUED | OUTPATIENT
Start: 2020-11-04 | End: 2020-11-04

## 2020-11-04 RX ORDER — PREDNISONE 20 MG/1
TABLET ORAL
Qty: 10 TABLET | Refills: 0 | Status: SHIPPED | OUTPATIENT
Start: 2020-11-04

## 2020-11-04 RX ORDER — FAMOTIDINE 20 MG/1
40 TABLET, FILM COATED ORAL 2 TIMES DAILY
Status: DISCONTINUED | OUTPATIENT
Start: 2020-11-04 | End: 2020-11-04 | Stop reason: HOSPADM

## 2020-11-04 RX ADMIN — FAMOTIDINE 40 MG: 20 TABLET ORAL at 18:42

## 2020-11-04 RX ADMIN — PREDNISONE 40 MG: 20 TABLET ORAL at 18:39

## 2020-11-04 ASSESSMENT — ENCOUNTER SYMPTOMS
TROUBLE SWALLOWING: 1
NUMBNESS: 1

## 2020-11-04 NOTE — ED PROVIDER NOTES
"History     Chief Complaint:  Numbness     HPI Room 28  Cong Coronel is a 35 year old male with a history of hypertension who presents for evaluation of numbness. The patient  States that the sides of his tongues and the sublingual region feel like they are \"on the verge of a juhi horse\", and vacillates in intensity, becoming reliable in intensity over the last 3 days.  He adds at times there is slurring of his speech. He has been unable to swallow at times due to the sensation radiation to his throat, but eventually is able to \"force\" himself to swallow.  He attributed it to his GERD, and takes omeprazole 2x daily, but this has not ameliorated his symptoms. He notes that today it has been present all day and he has been unable to eat or drink since breakfast. He has not been able to precipitate any foods or drinks that directly aggravate this, rather eating in general seems to cause this.    Allergies:  No Known Drug Allergies     Medications:   Buspar   Hydrodiuril     Medical History:   Hypertension     Surgical History   Surgical history reviewed. No pertinent surgical history.     Family History:   Family history reviewed. No pertinent family history.     Social History:  Patient was not accompanied to the ED.  Smoking Status: Negative   Smokeless Tobacco: Negative   Alcohol Use: Negative   Drug Use: Negative   Primary Physician: Kingsley Pina     Review of Systems   HENT: Positive for trouble swallowing.         Tongue numbness   Neurological: Positive for numbness.   All other systems reviewed and are negative.      Physical Exam     Patient Vitals for the past 24 hrs:   BP Temp Temp src Pulse Resp SpO2   11/04/20 1152 (!) 146/95 97  F (36.1  C) Temporal 89 (!) 1 97 %          Physical Exam  Constitutional: Alert   HENT:   Nose: Nose normal.   Mouth/Throat:   Moist mucus membranes.  Normal midline uvula  No peritonsillar erythema or swelling  No sublingual induration, swelling or tenderness  No " trismus  Normal dentition without gingival swelling or caries  Able to extend neck without discomfort  Tolerating secretions and able to breathe supine with ease  Eyes: EOM are normal. Pupils are equal, round, and reactive to light.   CV: Regular rate and rhythm, no murmurs, rubs or gallops.  Resp: Clear lungs to auscultation, all lung fields. Normal respiratory effort.   GI: Soft, non-distended. There is no tenderness. No rebound or guarding.   MSK: Normal range of motion. No deformity.   Neurological:   A/Ox3; Cranial nerves 2-12 intact;   5/5 strength is symmetric to the upper and lower extremities;   Sensation intact to light touch throughout the upper and lower extremities;   No meningismus   Skin: Skin is warm and dry.    Emergency Department Course     Interventions:   1800 Deltasone 40 mg PO  1800 Pepcid 40 mg PO     Emergency Department Course:    1744 Nursing notes and vitals reviewed.   I performed an exam of the patient as documented above.     1800 Findings and plan explained to the Patient. Patient discharged home with instructions regarding supportive care, medications, and reasons to return. The importance of close follow-up was reviewed. The patient was prescribed as below.    Impression & Plan     Medical Decision Making:  This is a 35-year-old male who comes in with fullness sensation to his tongue and sublingual and posterior tongue area.  It is been intermittent for approximately 1 week but gradually worsening over the past 3 days or so.  Patient ate breakfast and then had continued sensation of this throughout the day.  Patient feels like it is difficult for him to speak clearly.  Patient denies any throat swelling.  There is no lip fullness or swelling.  He is not having difficulty breathing or swallowing.  Exam as above.  I do not believe this patient's symptoms represent airway compromise or anaphylaxis. cranial nerves are intact and I do not believe that the patient has had a CVA or infarct  of any of his cranial nerves.  Patient may be having adenoid swelling/reaction to food he is eating but this is unclear and I do not believe that this would represent an airway compromise as this is been ongoing for multiple days without airway failure.  Patient was referred back to his primary care doctor for further evaluation.  He was given prescription for prednisone and Pepcid.  He was instructed to stop taking his omeprazole in the event that this is an island adverse reaction to his omeprazole.  Patient expressed understanding and is in agreement with the plan of care.    Diagnosis:     ICD-10-CM    1. Mild tongue swelling  R22.0         Disposition:  Discharged to home.    Discharge Medications:  Discharge Medication List as of 11/4/2020  6:27 PM      START taking these medications    Details   famotidine (PEPCID) 40 MG tablet Take 1 tablet (40 mg) by mouth daily for 14 days, Disp-14 tablet, R-0, E-Prescribe      predniSONE (DELTASONE) 20 MG tablet Take two tablets (= 40mg) each day for 5 (five) days, Disp-10 tablet, R-0, E-Prescribe             Scribe Disclosure:  I, Rob Stuart, am serving as a scribe at 5:44 PM on 11/4/2020 to document services personally performed by Jeovany Anne DO based on my observations and the provider's statements to me.     Jeovany Perez DO  11/04/20 3378

## 2020-11-04 NOTE — ED AVS SNAPSHOT
[facility]  [address] [facility] [address]  Phone: [phone]  Fax: [phone]                                    Cong Coronel   MRN: [id number]    Department: [facility]   Date of Visit: 11/4/2020           After Visit Summary Signature Page    I have received my discharge instructions, and my questions have been answered. I have discussed any challenges I see with this plan with the nurse or doctor.    ..........................................................................................................................................  Patient/Patient Representative Signature      ..........................................................................................................................................  Patient Representative Print Name and Relationship to Patient    ..................................................               ................................................  Date                                   Time    ..........................................................................................................................................  Reviewed by Signature/Title    ...................................................              ..............................................  Date                                               Time          22EPIC Rev 08/18

## 2020-11-06 ENCOUNTER — HOSPITAL ENCOUNTER (EMERGENCY)
Facility: CLINIC | Age: 35
Discharge: HOME | End: 2020-11-06
Attending: EMERGENCY MEDICINE | Admitting: EMERGENCY MEDICINE
Payer: COMMERCIAL

## 2020-11-06 ENCOUNTER — APPOINTMENT (OUTPATIENT)
Dept: GENERAL RADIOLOGY | Facility: CLINIC | Age: 35
End: 2020-11-06
Attending: EMERGENCY MEDICINE
Payer: COMMERCIAL

## 2020-11-06 VITALS
TEMPERATURE: 97.9 F | SYSTOLIC BLOOD PRESSURE: 116 MMHG | WEIGHT: 310 LBS | OXYGEN SATURATION: 95 % | HEIGHT: 77 IN | DIASTOLIC BLOOD PRESSURE: 83 MMHG | RESPIRATION RATE: 20 BRPM | HEART RATE: 63 BPM | BODY MASS INDEX: 36.6 KG/M2

## 2020-11-06 DIAGNOSIS — R07.9 CHEST PAIN, UNSPECIFIED TYPE: ICD-10-CM

## 2020-11-06 LAB
ALBUMIN SERPL-MCNC: 3.9 G/DL (ref 3.4–5)
ALP SERPL-CCNC: 68 U/L (ref 40–150)
ALT SERPL W P-5'-P-CCNC: 31 U/L (ref 0–70)
ANION GAP SERPL CALCULATED.3IONS-SCNC: 5 MMOL/L (ref 3–14)
AST SERPL W P-5'-P-CCNC: 11 U/L (ref 0–45)
BASOPHILS # BLD AUTO: 0 10E9/L (ref 0–0.2)
BASOPHILS NFR BLD AUTO: 0.3 %
BILIRUB SERPL-MCNC: 0.3 MG/DL (ref 0.2–1.3)
BUN SERPL-MCNC: 26 MG/DL (ref 7–30)
CALCIUM SERPL-MCNC: 8.5 MG/DL (ref 8.5–10.1)
CHLORIDE SERPL-SCNC: 104 MMOL/L (ref 94–109)
CO2 SERPL-SCNC: 28 MMOL/L (ref 20–32)
CREAT SERPL-MCNC: 1.14 MG/DL (ref 0.66–1.25)
DIFFERENTIAL METHOD BLD: ABNORMAL
EOSINOPHIL # BLD AUTO: 0 10E9/L (ref 0–0.7)
EOSINOPHIL NFR BLD AUTO: 0 %
ERYTHROCYTE [DISTWIDTH] IN BLOOD BY AUTOMATED COUNT: 12.4 % (ref 10–15)
GFR SERPL CREATININE-BSD FRML MDRD: 83 ML/MIN/{1.73_M2}
GLUCOSE SERPL-MCNC: 114 MG/DL (ref 70–99)
HCT VFR BLD AUTO: 45 % (ref 40–53)
HGB BLD-MCNC: 16 G/DL (ref 13.3–17.7)
IMM GRANULOCYTES # BLD: 0.1 10E9/L (ref 0–0.4)
IMM GRANULOCYTES NFR BLD: 0.4 %
INTERPRETATION ECG - MUSE: NORMAL
LYMPHOCYTES # BLD AUTO: 1.9 10E9/L (ref 0.8–5.3)
LYMPHOCYTES NFR BLD AUTO: 16.5 %
MCH RBC QN AUTO: 30.9 PG (ref 26.5–33)
MCHC RBC AUTO-ENTMCNC: 35.6 G/DL (ref 31.5–36.5)
MCV RBC AUTO: 87 FL (ref 78–100)
MONOCYTES # BLD AUTO: 1 10E9/L (ref 0–1.3)
MONOCYTES NFR BLD AUTO: 8.5 %
NEUTROPHILS # BLD AUTO: 8.7 10E9/L (ref 1.6–8.3)
NEUTROPHILS NFR BLD AUTO: 74.3 %
NRBC # BLD AUTO: 0 10*3/UL
NRBC BLD AUTO-RTO: 0 /100
PLATELET # BLD AUTO: 303 10E9/L (ref 150–450)
POTASSIUM SERPL-SCNC: 2.9 MMOL/L (ref 3.4–5.3)
PROT SERPL-MCNC: 7.6 G/DL (ref 6.8–8.8)
RBC # BLD AUTO: 5.18 10E12/L (ref 4.4–5.9)
SODIUM SERPL-SCNC: 137 MMOL/L (ref 133–144)
TROPONIN I SERPL-MCNC: <0.015 UG/L (ref 0–0.04)
TROPONIN I SERPL-MCNC: <0.015 UG/L (ref 0–0.04)
WBC # BLD AUTO: 11.7 10E9/L (ref 4–11)

## 2020-11-06 PROCEDURE — 84484 ASSAY OF TROPONIN QUANT: CPT | Mod: 91 | Performed by: EMERGENCY MEDICINE

## 2020-11-06 PROCEDURE — 85025 COMPLETE CBC W/AUTO DIFF WBC: CPT | Performed by: EMERGENCY MEDICINE

## 2020-11-06 PROCEDURE — 99285 EMERGENCY DEPT VISIT HI MDM: CPT | Mod: 25

## 2020-11-06 PROCEDURE — 93005 ELECTROCARDIOGRAM TRACING: CPT

## 2020-11-06 PROCEDURE — 96360 HYDRATION IV INFUSION INIT: CPT

## 2020-11-06 PROCEDURE — 80053 COMPREHEN METABOLIC PANEL: CPT | Performed by: EMERGENCY MEDICINE

## 2020-11-06 PROCEDURE — 250N000013 HC RX MED GY IP 250 OP 250 PS 637: Performed by: EMERGENCY MEDICINE

## 2020-11-06 PROCEDURE — 258N000003 HC RX IP 258 OP 636: Performed by: EMERGENCY MEDICINE

## 2020-11-06 PROCEDURE — 71046 X-RAY EXAM CHEST 2 VIEWS: CPT

## 2020-11-06 PROCEDURE — 96361 HYDRATE IV INFUSION ADD-ON: CPT

## 2020-11-06 RX ORDER — SODIUM CHLORIDE 9 MG/ML
INJECTION, SOLUTION INTRAVENOUS CONTINUOUS
Status: DISCONTINUED | OUTPATIENT
Start: 2020-11-06 | End: 2020-11-06 | Stop reason: HOSPADM

## 2020-11-06 RX ORDER — POTASSIUM CHLORIDE 1500 MG/1
40 TABLET, EXTENDED RELEASE ORAL ONCE
Status: COMPLETED | OUTPATIENT
Start: 2020-11-06 | End: 2020-11-06

## 2020-11-06 RX ADMIN — SODIUM CHLORIDE: 9 INJECTION, SOLUTION INTRAVENOUS at 03:43

## 2020-11-06 RX ADMIN — SODIUM CHLORIDE 1000 ML: 9 INJECTION, SOLUTION INTRAVENOUS at 02:20

## 2020-11-06 RX ADMIN — POTASSIUM CHLORIDE 40 MEQ: 1500 TABLET, EXTENDED RELEASE ORAL at 03:42

## 2020-11-06 ASSESSMENT — MIFFLIN-ST. JEOR: SCORE: 2463.53

## 2020-11-06 ASSESSMENT — ENCOUNTER SYMPTOMS
CHILLS: 0
VOMITING: 0
DIAPHORESIS: 1
FEVER: 0
NAUSEA: 1
SHORTNESS OF BREATH: 1

## 2020-11-06 NOTE — ED NOTES
Bed: ED27  Expected date:   Expected time:   Means of arrival:   Comments:  35m arm tingling no covid

## 2020-11-06 NOTE — ED PROVIDER NOTES
"History     Chief Complaint:  Chest Pain      HPI   Cong Coronel is a 35 year old male who presents with chest pain. The patient states that he developed sharp left sided chest and arm pain with associated nausea, sweating, and shortness of breath earlier today that woke him up from sleep. He states that his whole body started to become tingling and then his left arm pain became worse. He tried laying in his bed but then when the pain did not get any better, he called 911. The patient was subsequently transferred to the ED. He denies any fever, vomiting, chills, or rashes.     Allergies:  No Known Drug Allergies      Medications:   Buspar   Hydrodiuril      Medical History:   Hypertension      Surgical History   Surgical history reviewed. No pertinent surgical history.      Family History:   Family history reviewed. No pertinent family history.      Social History:  Patient was not accompanied to the ED.  Smoking Status: Negative   Smokeless Tobacco: Negative   Alcohol Use: Negative   Drug Use: Negative   Primary Physician: Kingsley Pina       Review of Systems   Constitutional: Positive for diaphoresis. Negative for chills and fever.   Respiratory: Positive for shortness of breath.    Cardiovascular: Positive for chest pain (left).   Gastrointestinal: Positive for nausea. Negative for vomiting.   Musculoskeletal:        Left arm pain   Skin: Negative for rash.   All other systems reviewed and are negative.      Physical Exam     Patient Vitals for the past 24 hrs:   BP Temp Temp src Pulse Resp SpO2 Height Weight   11/06/20 0445 120/78 -- -- 57 14 96 % -- --   11/06/20 0430 124/76 -- -- 67 18 95 % -- --   11/06/20 0345 130/85 -- -- 64 11 97 % -- --   11/06/20 0336 125/76 97.6  F (36.4  C) Oral 67 20 95 % -- --   11/06/20 0211 -- 97.7  F (36.5  C) Oral -- -- -- 1.956 m (6' 5\") 140.6 kg (310 lb)        Physical Exam  General: Resting on the bed.  Head: No obvious trauma to head.  Ears, Nose, Throat:  External " ears normal.  Nose normal.   Eyes:  Conjunctivae clear.  Pupils are equal, round, and reactive.   Neck: Normal range of motion.  Neck supple.   CV: Regular rate and rhythm.  No murmurs.    2+ radial pulses  Respiratory: Effort normal and breath sounds normal.  No wheezing or crackles.   Gastrointestinal: Soft.  No distension. There is no tenderness.  There is no rigidity, no rebound and no guarding.   Musculoskeletal: Non tender non edematous calves  Neuro: Alert. Moving all extremities appropriately.  Normal speech.    Skin: Skin is warm and dry.  No rash noted.     Emergency Department Course   ECG:  ECG taken at 0227  Sinus rhythm  Normal ECG  Vent. rate 69 BPM  AK interval 174 ms  QRS duration 102 ms  QT/QTc 440/471 ms  P-R-T axes 45 24 27  No significant change when compared to EKG dated 3/22/20.     Imaging:  Radiology findings were communicated with the patient who voiced understanding of the findings.    XR Chest 2 Views  Final Result  IMPRESSION: Cardiomediastinal silhouette is within normal limits. No focal consolidation or pleural effusion. Slight elevation right hemidiaphragm.  Reading per radiology     Laboratory:  Laboratory findings were communicated with the patient who voiced understanding of the findings.    Troponin(0218):  <0.015      Troponin(0342):  <0.015      CBC:  WBC 11.7 (H), HGB 16.0, , o/w WNL     CMP: Glucose 114 (H), potassium 2.9 (L), o/w WNL (Creatinine: 1.14)     Interventions:  0220 0.9% sodium chloride BOLUS 1000 mL IV   0342 Potassium chloride ER 40 mEq oral      Emergency Department Course:  Past medical records, nursing notes, and vitals reviewed.    0304 I performed an exam of the patient as documented above.    IV was inserted and blood was drawn for laboratory testing, results above.     The patient was sent for imaging while in the emergency department, results above.       0515 Patient rechecked and updated.       Findings and plan explained to the Patient. Patient  discharged home with instructions regarding supportive care, medications, and reasons to return. The importance of close follow-up was reviewed.       Impression & Plan     Medical Decision Making:  Cong Coronel is a 35 year old male who presents with chest pain.  The work up in the Emergency Department is negative.  No signs are reassuring.  Very pleasant and cooperative male.  The differential diagnosis of chest pain is broad and includes life threatening etiologies such as acute coronary syndrome, myocardial infarction, pulmonary embolism, acute aortic dissection, amongst others.  CBC shows mild leukocytosis but no evidence of anemia.  CMP shows mild hypokalemia but no evidence of acute electrolyte, metabolic or renal dysfunction otherwise noted.  Chest x-ray shows normal lung fields, normal cardiac silhouette, no signs of consolidation, effusion or pneumothorax.  Suspicion for dissection without tearing pain, symmetric pulses, normal-appearing cardiac silhouette.  The patient's EKG was nonischemic and troponin was normal.  The chest pain symptom complex would be atypical for coronary ischemia.  The patient is low risk for PE, no pleuritic chest pain, no hypoxia, no tachycardia, overall very low suspicion for PE.  Do not think that further work-up is indicated.  Patient is heart score 2, therefore very low sufficient for ACS.  With 2 troponins I feel conned with patient discharging to home.  I suspect that his numbness likely triggered his anxiety although he has also had his acid reflux medications changed recently as well.  These may both be contributing to his chest discomfort.  No serious etiology for the chest pain were detected today during this visit.   Close follow up with primary care is indicated should the pain continue, as further work up may be performed; this was made clear to the patient, who understands.     Discharge Diagnosis:    ICD-10-CM    1. Chest pain, unspecified type  R07.9         Disposition:  The patient is discharged to home.    Scribe Disclosure:  I, Sky Brooke, am serving as a scribe at 3:04 AM on 11/6/2020 to document services personally performed by Soledad Wood MD based on my observations and the provider's statements to me.      11/6/2020   Soledad Wood MD Bennett, Jennifer L, MD  11/06/20 0807

## 2020-11-06 NOTE — ED TRIAGE NOTES
Pt was brought in by EMS from home for chest pain, diaphoretic, generalized weakness and numbness. Pt states he was sleeping when he woke up feeling that his left arm numbness and tingling, then pt felt left sided chest pain. Pt moved around in bed, the felt nauseous and diaphoretic. Pt states he felt dizzy after walking to the bathroom but felt numbness all over and he felt he was unable to pain all over. Pt called 911.

## 2020-11-06 NOTE — ED AVS SNAPSHOT
[facility] Emergency Dept  [address] [facility] [place] [address]  Phone: [phone]  Fax: [phone]                                    Cong Coronel   MRN: [id number]    Department: [facility] Emergency Dept   Date of Visit: 11/6/2020           After Visit Summary Signature Page    I have received my discharge instructions, and my questions have been answered. I have discussed any challenges I see with this plan with the nurse or doctor.    ..........................................................................................................................................  Patient/Patient Representative Signature      ..........................................................................................................................................  Patient Representative Print Name and Relationship to Patient    ..................................................               ................................................  Date                                   Time    ..........................................................................................................................................  Reviewed by Signature/Title    ...................................................              ..............................................  Date                                               Time          22EPIC Rev 08/18